# Patient Record
Sex: MALE | Race: WHITE | NOT HISPANIC OR LATINO | Employment: STUDENT | URBAN - METROPOLITAN AREA
[De-identification: names, ages, dates, MRNs, and addresses within clinical notes are randomized per-mention and may not be internally consistent; named-entity substitution may affect disease eponyms.]

---
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
## 2022-01-05 ENCOUNTER — OFFICE VISIT (OUTPATIENT)
Dept: ORTHOPEDIC SURGERY | Facility: CLINIC | Age: 22
End: 2022-01-05

## 2022-01-05 VITALS — HEIGHT: 69 IN | WEIGHT: 199 LBS | BODY MASS INDEX: 29.47 KG/M2

## 2022-01-05 DIAGNOSIS — Z98.890 STATUS POST ANTERIOR CRUCIATE LIGAMENT SURGERY: Primary | ICD-10-CM

## 2022-01-05 DIAGNOSIS — Z98.890 STATUS POST ARTHROSCOPIC KNEE SURGERY: ICD-10-CM

## 2022-01-05 PROCEDURE — 99213 OFFICE O/P EST LOW 20 MIN: CPT | Performed by: ORTHOPAEDIC SURGERY

## 2022-01-05 PROCEDURE — 73562 X-RAY EXAM OF KNEE 3: CPT | Performed by: ORTHOPAEDIC SURGERY

## 2022-01-05 NOTE — PROGRESS NOTES
Subjective:     Patient ID: Reynaldo Santoyo is a 21 y.o. male.    Chief Complaint:  Status post left knee revision ACL reconstruction with allograft, 8/20/2021    History of Present Illness  Reynaldo Santoyo returns to clinic today for postoperative follow-up of left knee. He states that he has been released from physical therapy, as he had surpassed expectations. He reports no problems, issues, or concerns regarding the left knee. Activity-wise, he reports only working at [employer] currently. The patient's mom has questions regarding the feasibility of  service, as the patient has expressed an interested in joining the [employer]. 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      Social History     Occupational History   • Not on file   Tobacco Use   • Smoking status: Never Smoker   • Smokeless tobacco: Never Used   Vaping Use   • Vaping Use: Never used   Substance and Sexual Activity   • Alcohol use: No   • Drug use: No   • Sexual activity: Defer      Past Medical History:   Diagnosis Date   • ADHD    • Anterior cruciate ligament complete tear    • Anxiety    • History of concussion 2019   • Left knee pain     SCHEDULED FOR SX     Past Surgical History:   Procedure Laterality Date   • CIRCUMCISION REVISION     • EAR TUBES     • KNEE ACL RECONSTRUCTION Left 8/20/2021    Procedure: KNEE ANTERIOR CRUCIATE LIGAMENT RECONSTRUCTION WITH ALLOGRAFT.;  Surgeon: Thanh Box MD;  Location:  [facility];  Service: Orthopedics;  Laterality: Left;   • KNEE ARTHROSCOPY Left 9/28/2018    Procedure: KNEE ARTHROSCOPY LEFT-ACL reconstruction with allograft-Arthrex;  Surgeon: Alex Hampton MD;  Location:  [facility];  Service: Orthopedics   • KNEE SURGERY     • MEDIAL COLLATERAL LIGAMENT REPAIR, KNEE Left 12/2/2020    Procedure: KNEE ARTHROSCOPY, anterior cruciate ligament tunnel grafting;  Surgeon: Thanh Box MD;   "Location: [facility];  Service: Orthopedics;  Laterality: Left;       Family History   Problem Relation Age of Onset   • Thyroid disease Mother    • Anesthesia problems Father         slow to wake, had seizure when being put under anesthesia not sure what it was d/t   • Malig Hyperthermia Neg Hx          Review of Systems        Objective:  Vitals:    01/05/22 1130   Weight: 90.3 kg (199 lb)   Height: 175.3 cm (69\")         01/05/22  1130   Weight: 90.3 kg (199 lb)     Body mass index is 29.39 kg/m².  General: No acute distress.  Resp: normal respiratory effort  Skin: no rashes or wounds; normal turgor  Psych: mood and affect appropriate; recent and remote memory intact          Ortho Exam       Left Knee-     ROM 0 to 140 degrees  Incision is well healed.   Strength 5/5 on flexion  Strength 5/5 on extension     Effusion- Negative.   Grade 1A Lachman  Anterior drawer- Negative.   Posterior drawer- Negative.   Stable to varus and valgus stress at 0 and 30 degrees.    Imaging:  Left Knee X-Ray  Indication: Status post anterior cruciate ligament reconstruction    AP, Lateral, and notch views    Findings:  Tibial and femoral tunnels in stable position and with tunnel lysis noted on both femoral and tibial sides, implants evident with no evidence of loosening or disruption  Acceptable overall alignment  No evidence of intra-articular loose body    Compared to prior office x-rays     Assessment:        1. Status post anterior cruciate ligament surgery- revision-8/20/2021    2. Status post arthroscopic, LEFT knee arthroscopy with bone grafting of femoral and tibial cystic tunnels from prior ACL reconstruction, hardware removal, DOS 12/02/2020           Plan:          1. Discussed treatment options at length with patient at today's visit.  2. Follow up in 4 months with repeat x-rays of left knee.  3. Continue hip core and quad strengthening in the meantime.   4. May use knee sleeve for comfort.    5. Bracing recommended for " uneven surfaces.   6. Recommended waiting 1 year before sports with running and cutting activities such as basketball, football, and baseball or recreational activities such as skiing or snowboarding.   7. We will perform functional assessment test in 2 to 3 months and then follow up at 4-month elyssa to discuss feasibility regarding patient's interest in joining the [employer].       Reynaldo Santoyo was in agreement with plan and had all questions answered.     Orders:  Orders Placed This Encounter   Procedures   • XR Knee 3 View Left   • Ambulatory Referral to Physical Therapy Evaluate and treat, Ortho, POST OP       Medications:  No orders of the defined types were placed in this encounter.      Followup:  Return in about 4 months (around 5/5/2022) for xrays needed at follow up.    Diagnoses and all orders for this visit:    1. Status post anterior cruciate ligament surgery- revision-8/20/2021 (Primary)  -     Ambulatory Referral to Physical Therapy Evaluate and treat, Ortho, POST OP    2. Status post arthroscopic, LEFT knee arthroscopy with bone grafting of femoral and tibial cystic tunnels from prior ACL reconstruction, hardware removal, DOS 12/02/2020  -     XR Knee 3 View Left  -     Ambulatory Referral to Physical Therapy Evaluate and treat, Ortho, POST OP          Dictated utilizing Dragon dictation     Transcribed from ambient dictation for Thanh Box MD by Darlin Sandoval.  01/05/22   12:55 EST    Patient verbalized consent to the visit recording.  I have personally performed the services described in this document as transcribed by the above individual, and it is both accurate and complete.  Thanh Box MD  1/11/2022  07:38 EST

## 2022-05-02 ENCOUNTER — OFFICE VISIT (OUTPATIENT)
Dept: ORTHOPEDIC SURGERY | Facility: CLINIC | Age: 22
End: 2022-05-02

## 2022-05-02 VITALS — WEIGHT: 199 LBS | HEIGHT: 69 IN | BODY MASS INDEX: 29.47 KG/M2

## 2022-05-02 DIAGNOSIS — Z98.890 STATUS POST ANTERIOR CRUCIATE LIGAMENT SURGERY: Primary | ICD-10-CM

## 2022-05-02 PROCEDURE — 99213 OFFICE O/P EST LOW 20 MIN: CPT | Performed by: ORTHOPAEDIC SURGERY

## 2022-05-02 PROCEDURE — 73562 X-RAY EXAM OF KNEE 3: CPT | Performed by: ORTHOPAEDIC SURGERY

## 2022-05-02 NOTE — PROGRESS NOTES
Subjective:     Patient ID: Reynaldo Santoyo is a 21 y.o. male.    Chief Complaint:  Follow-up left knee revision ACL reconstruction with allograft, 8/20/2021    History of Present Illness  Reynaldo Santoyo returns to clinic today for evaluation status post left knee ACL reconstruction with hamstring autograft. He is accompanied by an adult male. He is thinking about attending [employer] at [employer].    The patient is doing well overall and notes minimal to no pain. He denies any buckling episodes or swelling. The patient denies attending physical therapy for a functional test.  He has tried oral steroids, meloxicam and Celebrex, with no relief. The patient had 2 previous corticosteroid injections, with his most recent injection being approximately 3 to 4 weeks ago. He has not taken Voltaren in the past.      Social History     Occupational History   • Not on file   Tobacco Use   • Smoking status: Never Smoker   • Smokeless tobacco: Never Used   Vaping Use   • Vaping Use: Never used   Substance and Sexual Activity   • Alcohol use: No   • Drug use: No   • Sexual activity: Defer      Past Medical History:   Diagnosis Date   • ADHD    • Anterior cruciate ligament complete tear    • Anxiety    • History of concussion 2019   • Left knee pain     SCHEDULED FOR SX     Past Surgical History:   Procedure Laterality Date   • CIRCUMCISION REVISION     • EAR TUBES     • KNEE ACL RECONSTRUCTION Left 8/20/2021    Procedure: KNEE ANTERIOR CRUCIATE LIGAMENT RECONSTRUCTION WITH ALLOGRAFT.;  Surgeon: Thanh Box MD;  Location:  [facility] OR;  Service: Orthopedics;  Laterality: Left;   • KNEE ARTHROSCOPY Left 9/28/2018    Procedure: KNEE ARTHROSCOPY LEFT-ACL reconstruction with allograft-Arthrex;  Surgeon: Alex Hampton MD;  Location: [facility] OR;  Service: Orthopedics   • KNEE SURGERY     • MEDIAL COLLATERAL LIGAMENT REPAIR, KNEE Left 12/2/2020    Procedure: KNEE ARTHROSCOPY, anterior cruciate ligament tunnel grafting;  Surgeon:  "Thanh Box MD;  Location: [facility];  Service: Orthopedics;  Laterality: Left;       Family History   Problem Relation Age of Onset   • Thyroid disease Mother    • Anesthesia problems Father         slow to wake, had seizure when being put under anesthesia not sure what it was d/t   • Malig Hyperthermia Neg Hx          Review of Systems        Objective:  Vitals:    05/02/22 1057   Weight: 90.3 kg (199 lb)   Height: 175.3 cm (69\")         05/02/22  1057   Weight: 90.3 kg (199 lb)     Body mass index is 29.39 kg/m².  General: No acute distress.  Resp: normal respiratory effort  Skin: no rashes or wounds; normal turgor  Psych: mood and affect appropriate; recent and remote memory intact          Ortho Exam       Left Knee-    Incisions well healed.  ROM 0 to 140 degrees  4+/5 on flexion  4+/5 on extension  Effusion- Minimal  Grade 1A Lachman  Anterior drawer- Negative  Posterior drawer- Negative  Stable opening on varus and valgus stress at 0 and 30 degrees  Log roll- No hip pain  Stinchfield- No hip pain    Imaging:  Left Knee X-Ray  Indication: Status post anterior cruciate ligament reconstruction    AP, Lateral, and notch views    Findings:  Tibial and femoral tunnels in stable position with tunnel lysis noted to be fairly consistent in comparison to prior films  Implants evident with no evidence of loosening or disruption  Acceptable overall alignment  No evidence of intra-articular loose body    Compared to prior office x-rays    Assessment:        1. Status post anterior cruciate ligament surgery           Plan:          1. Discussed treatment options at length with patient at today's visit.   2.  Continue working on home exercise for strength.  3. We will get him set up for an ACL functional test with physical therapy to assess for stability and function on the left side.  4. Follow up in 4 months with repeat x-rays of the left knee.      Reynaldo Santoyo was in agreement with plan and had all questions " answered.     Orders:  Orders Placed This Encounter   Procedures   • XR Knee 3 View Left   • Ambulatory Referral to Physical Therapy Evaluate and treat, Ortho, POST OP       Medications:  No orders of the defined types were placed in this encounter.      Followup:  Return in about 4 months (around 9/2/2022) for xrays needed at follow up.    Diagnoses and all orders for this visit:    1. Status post anterior cruciate ligament surgery (Primary)  -     XR Knee 3 View Left  -     Ambulatory Referral to Physical Therapy Evaluate and treat, Ortho, POST OP          Dictated utilizing Dragon dictation     Transcribed from ambient dictation for Thanh Box MD by America Haddad.  05/02/22   14:54 EDT    Patient verbalized consent to the visit recording.  I have personally performed the services described in this document as transcribed by the above individual, and it is both accurate and complete.  Thanh Box MD  5/17/2022  06:41 EDT

## 2022-06-01 ENCOUNTER — HOSPITAL ENCOUNTER (OUTPATIENT)
Dept: PHYSICAL THERAPY | Facility: HOSPITAL | Age: 22
Setting detail: THERAPIES SERIES
Discharge: HOME | End: 2022-06-01

## 2022-06-01 NOTE — SIGNIFICANT NOTE
Initiated training toward ACL functional test. Pt states he had been discharged from PT about 2 months ago and will f/u with  after completing the ACL test.    Completed full practice test and pt demonstrates good quad strength and balance. Encouraged pt to complete increased reps of jumping to gain confidence.     Pt scheduled to complete test in 3 weeks.

## 2022-06-23 ENCOUNTER — HOSPITAL ENCOUNTER (OUTPATIENT)
Dept: PHYSICAL THERAPY | Facility: HOSPITAL | Age: 22
Setting detail: THERAPIES SERIES
Discharge: HOME | End: 2022-06-23

## 2022-06-23 DIAGNOSIS — Z98.890 STATUS POST ANTERIOR CRUCIATE LIGAMENT SURGERY: Primary | ICD-10-CM

## 2022-09-07 ENCOUNTER — OFFICE VISIT (OUTPATIENT)
Dept: ORTHOPEDIC SURGERY | Facility: CLINIC | Age: 22
End: 2022-09-07

## 2022-09-07 VITALS — WEIGHT: 199 LBS | BODY MASS INDEX: 29.47 KG/M2 | HEIGHT: 69 IN

## 2022-09-07 DIAGNOSIS — Z98.890 STATUS POST ANTERIOR CRUCIATE LIGAMENT SURGERY: Primary | ICD-10-CM

## 2022-09-07 DIAGNOSIS — S84.92XA NEURAPRAXIA OF LEFT LOWER EXTREMITY, INITIAL ENCOUNTER: ICD-10-CM

## 2022-09-07 PROCEDURE — 99213 OFFICE O/P EST LOW 20 MIN: CPT | Performed by: ORTHOPAEDIC SURGERY

## 2022-09-07 PROCEDURE — 73562 X-RAY EXAM OF KNEE 3: CPT | Performed by: ORTHOPAEDIC SURGERY

## 2022-09-07 NOTE — PROGRESS NOTES
Subjective:     Patient ID: Reynaldo Santoyo is a 21 y.o. male.    Chief Complaint:  Follow-up status post left knee revision ACL reconstruction with allograft, 8/20/2021  History of Present Illness  Reynaldo Santoyo returns to clinic today for evaluation of status post left knee ACL reconstruction with  autograft. The patient reports he is doing well overall, but notes increasing pain over the anteromedial aspect of his left leg in the region of his tibial screw and tibial tunnel. He describes this as a burning-type pain that he rates as a 6 to 7 out of 10. He denies any redness or warmth. He denies any drainage from the incision. He does describe it again as burning with no recent injury. He states the pain is worse with ambulation with the onset of pain beginning 2 days ago.    The patient is attending [employer] and is planning a career in welding.     Social History     Occupational History   • Not on file   Tobacco Use   • Smoking status: Never Smoker   • Smokeless tobacco: Never Used   Vaping Use   • Vaping Use: Never used   Substance and Sexual Activity   • Alcohol use: No   • Drug use: No   • Sexual activity: Defer      Past Medical History:   Diagnosis Date   • ADHD    • Anterior cruciate ligament complete tear    • Anxiety    • History of concussion 2019   • Left knee pain     SCHEDULED FOR SX     Past Surgical History:   Procedure Laterality Date   • CIRCUMCISION REVISION     • EAR TUBES     • KNEE ACL RECONSTRUCTION Left 8/20/2021    Procedure: KNEE ANTERIOR CRUCIATE LIGAMENT RECONSTRUCTION WITH ALLOGRAFT.;  Surgeon: Thanh Box MD;  Location:  [facility];  Service: Orthopedics;  Laterality: Left;   • KNEE ARTHROSCOPY Left 9/28/2018    Procedure: KNEE ARTHROSCOPY LEFT-ACL reconstruction with allograft-Arthrex;  Surgeon: Alex Hampton MD;  Location:  [facility] OR;  Service: Orthopedics   • KNEE SURGERY     • MEDIAL COLLATERAL LIGAMENT REPAIR, KNEE Left 12/2/2020     "Procedure: KNEE ARTHROSCOPY, anterior cruciate ligament tunnel grafting;  Surgeon: Thanh Box MD;  Location: [facility];  Service: Orthopedics;  Laterality: Left;       Family History   Problem Relation Age of Onset   • Thyroid disease Mother    • Anesthesia problems Father         slow to wake, had seizure when being put under anesthesia not sure what it was d/t   • Malig Hyperthermia Neg Hx          Review of Systems        Objective:  Vitals:    09/07/22 0817   Weight: 90.3 kg (199 lb)   Height: 175.3 cm (69\")         09/07/22 0817   Weight: 90.3 kg (199 lb)     Body mass index is 29.39 kg/m².  Physical Exam    Vital signs reviewed.   General: No acute distress, alert and oriented  Eyes: conjunctiva clear; pupils equally round and reactive  ENT: external ears and nose atraumatic; oropharynx clear  CV: no peripheral edema  Resp: normal respiratory effort  Skin: no rashes or wounds; normal turgor  Psych: mood and affect appropriate; recent and remote memory intact          Ortho Exam       Left Knee-    Active range of motion is 0 to 140 degrees with 5/5 strength.  Mild tenderness to palpation as well as tingling noted in the anteromedial tibia over the prior tunnel site.  Incision is well-healed. No signs of erythema or fluctuance.    Effusion- None  Grade 1A Lachman  Anterior drawer- Negative  Posterior drawer- Negative    Imaging:  Left Knee X-Ray  Indication: Status post anterior cruciate ligament reconstruction    AP, Lateral, and notch views    Findings:  Tibial and femoral tunnels in stable position with stable tunnel lysis and insignificant change in comparison to prior films from immediate postoperative period back in September 2021 despite further delineation of the bony extent of tunnels noted on today's films.  Implants evident with no evidence of loosening or disruption  Acceptable overall alignment  No evidence of intra-articular loose body    Compared to prior office " x-rays    Assessment:        1. Status post anterior cruciate ligament surgery    2. Neurapraxia of left lower extremity, initial encounter           Plan:          1. I discussed treatment options at length with patient at today's visit. At this point in time, we are going to try a compounding cream - a prescription is written for that today including ketamine and gabapentin to try to help with some of his neuropathic pain topically.  2. The patient was recommended soft tissue massages and to use a knee sleeve as well.  3. He will follow up in 6 months with repeat x-rays of the left knee. If he is still having issues at that point in time, we could discuss the further option for hardware removal of the tibial screw. The patient was advised to call the office earlier with any concerns.      Reynaldo Santoyo was in agreement with plan and had all questions answered.     Orders:  Orders Placed This Encounter   Procedures   • XR Knee 3 View Left       Medications:  No orders of the defined types were placed in this encounter.      Followup:  Return in about 6 months (around 3/7/2023) for xrays needed at follow up.    Diagnoses and all orders for this visit:    1. Status post anterior cruciate ligament surgery (Primary)  -     XR Knee 3 View Left    2. Neurapraxia of left lower extremity, initial encounter          Dictated utilizing Dragon dictation       Transcribed from ambient dictation for Thanh Box MD by Anali Simms. .  09/07/22   12:06 EDT    Patient verbalized consent to the visit recording.  I have personally performed the services described in this document as transcribed by the above individual, and it is both accurate and complete.  Thanh Box MD  9/7/2022  22:16 EDT

## (undated) DEVICE — ACTIVE WRAP®, KNEE/LEG: Brand: DEROYAL

## (undated) DEVICE — 4.5 MM CONCAVE, FULL RADIUS,                                    CURVE, CURVED BLADES, POWER/EP-1,                                    YELLOW, CURVE LENGHTS 17 MM,                                    PACKAGED 6 PER BOX, STERILE

## (undated) DEVICE — GLV SURG SENSICARE W/ALOE PF LF 7.5 STRL

## (undated) DEVICE — LEGGINGS, PAIR, CLEAR, STERILE: Brand: MEDLINE

## (undated) DEVICE — CLEAR-TRAC DISPOSABLE STOPCOCK: Brand: CLEAR-TRAC

## (undated) DEVICE — DRSNG SURESITE WNDW 4X4.5

## (undated) DEVICE — NDL SPINE 22G 31/2IN BLK

## (undated) DEVICE — SYS SKIN CLS DERMABOND PRINEO W/22CM MESH TP

## (undated) DEVICE — Device

## (undated) DEVICE — DRSNG GZ PETROLTM XEROFORM CURAD 1X8IN STRL

## (undated) DEVICE — SOL ISO/ALC RUB 70PCT 4OZ

## (undated) DEVICE — SYS CLS SKIN PREMIERPRO EXOFINFUSION 22CM

## (undated) DEVICE — LAG ARTHROSCOPY: Brand: MEDLINE INDUSTRIES, INC.

## (undated) DEVICE — 3M™ IOBAN™ 2 ANTIMICROBIAL INCISE DRAPE 6650EZ: Brand: IOBAN™ 2

## (undated) DEVICE — SUT VIC 0 CT1 CR8 18IN J840D

## (undated) DEVICE — ENDOSCOPIC CANNULATED DRILL BIT,                                    4.5 MM, STERILE

## (undated) DEVICE — WEREWOLF FLOW 50 COBLATION WAND: Brand: COBLATION

## (undated) DEVICE — ADAPT DB SPIKE 2PCT FOR AR6400 TBG

## (undated) DEVICE — GLV SURG SENSICARE PI MIC PF SZ8 LF STRL

## (undated) DEVICE — 1.2 RAP-PAC B LATEX FREE: Brand: RAP-PAC

## (undated) DEVICE — SPNG LAP 18X18IN LF STRL PK/5

## (undated) DEVICE — GLV SURG SENSICARE PI PF LF 7 GRN STRL

## (undated) DEVICE — MAT FLR ABSORBENT LG 4FT 10 2.5FT

## (undated) DEVICE — PAD UNDERCAST WYTEX 4IN 4YD LF STRL

## (undated) DEVICE — INTENT TO BE USED WITH SUTURE MATERIAL FOR TISSUE CLOSURE: Brand: RICHARD-ALLAN® NEEDLE 1/2 CIRCLE TAPER

## (undated) DEVICE — 3M™ STERI-STRIP™ COMPOUND BENZOIN TINCTURE 40 BAGS/CARTON 4 CARTONS/CASE C1544: Brand: 3M™ STERI-STRIP™

## (undated) DEVICE — THIN OFFSET (9.0 X 0.38 X 25.0MM)

## (undated) DEVICE — APPL CHLORAPREP W/TINT 26ML ORNG

## (undated) DEVICE — GLV SURG NEOLON 2G PF LF 7.5 STRL

## (undated) DEVICE — PILOT DRILL FOR USE WITH MITEK CORTICAL & CANCELLOUS SCREWS 3.2MM

## (undated) DEVICE — IMMOB KN 3PNL DLX CANVS 22IN BLU

## (undated) DEVICE — SUPER MULTIVAC 50 WITH INTEGRATED                                    FINGER SWITCHES IFS: Brand: COBLATION

## (undated) DEVICE — UNDYED BRAIDED (POLYGLACTIN 910), SYNTHETIC ABSORBABLE SUTURE: Brand: COATED VICRYL

## (undated) DEVICE — INTENDED FOR TISSUE SEPARATION, AND OTHER PROCEDURES THAT REQUIRE A SHARP SURGICAL BLADE TO PUNCTURE OR CUT.: Brand: BARD-PARKER ® STAINLESS STEEL BLADES

## (undated) DEVICE — DRSNG TELFA PAD NONADH STR 1S 3X8IN

## (undated) DEVICE — SUT MNCRYL 3/0 PS2 27IN Y427H

## (undated) DEVICE — TOWEL,OR,DSP,ST,BLUE,STD,4/PK,20PK/CS: Brand: MEDLINE

## (undated) DEVICE — IRRIGATOR BULB ASEPTO 60CC STRL

## (undated) DEVICE — TUBING, SUCTION, 1/4" X 12', STRAIGHT: Brand: MEDLINE

## (undated) DEVICE — SWABSTCK BENZOIN TINCTURE

## (undated) DEVICE — 4.5 MM INCISOR PLUS ELITE STRAIGHT                                    DISPOSABLE BLADES, SLATE,PACKAGED 6                                    PER BOX, STERILE

## (undated) DEVICE — TRANSPOSAL ULTRAFLEX DUO/QUAD ULTRA CART MANIFOLD

## (undated) DEVICE — 2.4MM X 10 INCH DRILL-TIP GUIDE WIRE: Brand: ENDOBUTTON

## (undated) DEVICE — WRAP KNEE COLD THERAPY

## (undated) DEVICE — CVR C/ARM MINI

## (undated) DEVICE — DYONICS 5.5 FULL RADIUS BONECUTTER                                    BLADES, ORANGE, 8000 MAXIMUM RPM,                                    PACKAGED 6 PER BOX, STERILE

## (undated) DEVICE — REAMER SHAFT, MOD.TRINKLE: Brand: BIXCUT

## (undated) DEVICE — SKIN PREP TRAY W/CHG: Brand: MEDLINE INDUSTRIES, INC.

## (undated) DEVICE — BNDG ELAS ELITE V/CLOSE 4IN 5YD LF

## (undated) DEVICE — SUT VIC 0/0 UR6 27IN DYED J603H

## (undated) DEVICE — SYR CONTRL LUERLOK 10CC

## (undated) DEVICE — SPNG GZ WOVN 4X4IN 12PLY 10/BX STRL

## (undated) DEVICE — APPL CHLORAPREP HI/LITE 26ML ORNG

## (undated) DEVICE — GLV SURG SENSICARE PI LF PF 7.0

## (undated) DEVICE — PK BASIC ORTHO 90

## (undated) DEVICE — ST TB GOFLO STRL

## (undated) DEVICE — FOAM WRAP, KNEE: Brand: DEROYAL

## (undated) DEVICE — DISPOSABLE TOURNIQUET CUFF SINGLE BLADDER, SINGLE PORT AND LUER LOCK CONNECTOR: Brand: COLOR CUFF

## (undated) DEVICE — PREP SOL POVIDONE/IODINE BT 4OZ

## (undated) DEVICE — SUT VIC 0 CT1 36IN J946H

## (undated) DEVICE — FRAZIER SUCTION INSTRUMENT 10 FR W/CONTROL VENT & OBTURATOR: Brand: FRAZIER

## (undated) DEVICE — WEBRIL* CAST PADDING: Brand: DEROYAL

## (undated) DEVICE — SUT VIC 2/0 CT1 CR8 18IN J839D

## (undated) DEVICE — DRSNG PAD ABD 8X10IN STRL

## (undated) DEVICE — IMPLANTABLE DEVICE
Type: IMPLANTABLE DEVICE | Site: KNEE | Status: NON-FUNCTIONAL
Removed: 2020-12-02

## (undated) DEVICE — 3M™ STERI-STRIP™ REINFORCED ADHESIVE SKIN CLOSURES, R1547, 1/2 IN X 4 IN (12 MM X 100 MM), 6 STRIPS/ENVELOPE: Brand: 3M™ STERI-STRIP™

## (undated) DEVICE — GLV SURG EUDERMIC PF LTX 8 STRL

## (undated) DEVICE — SUT ETHLN 3/0 PS2 18 IN 1669H

## (undated) DEVICE — SUT VIC 2/0 CP2 CR8 18IN J762D

## (undated) DEVICE — BOWL UTIL STRL 32OZ

## (undated) DEVICE — BNDG ELAS ELITE V/CLOSE 6IN 5YD LF STRL

## (undated) DEVICE — DRSNG WND GZ CURAD OIL EMULSION 3X3IN STRL

## (undated) DEVICE — GLV SURG SENSICARE PI MIC PF SZ7.5 LF STRL

## (undated) DEVICE — FLIPCUTTER 2 SHT 10.5MM STRL

## (undated) DEVICE — GLV SURG SENSICARE PI LF PF 7.5